# Patient Record
Sex: FEMALE | URBAN - METROPOLITAN AREA
[De-identification: names, ages, dates, MRNs, and addresses within clinical notes are randomized per-mention and may not be internally consistent; named-entity substitution may affect disease eponyms.]

---

## 2019-03-07 NOTE — PATIENT DISCUSSION
Recommend Mini lower lift, deep plain post-tragel, SMAS to ML/mid cheeks(discussed risks and benefits of sx. .. ).

## 2019-03-07 NOTE — PATIENT DISCUSSION
Recommend * units of Botox. Patient elects Botox injection. *units discarded, * units used. (discussed risks and benefits of BOTOX. ..) 50 units rec.

## 2019-03-07 NOTE — PATIENT DISCUSSION
Recommend 1 ultra  plus 1 cc of Juvederm (discussed risks and benefits. ..). would only rec lips in sx to fill.

## 2019-03-07 NOTE — PATIENT DISCUSSION
Recommend ultra plus x 2cc's of vollure (discussed risks and benefits. ..) use a tiny bit of juvederm into glabella lines that are more prom.

## 2019-03-11 NOTE — PATIENT DISCUSSION
This visual field clearly demonstrated a minimum of 47% loss of upper field of vision OU, with upper lid skin in repose and elevated by taping of the lid to demonstrate potential correction. This field shows that taping the lids significantly improved this patient's superior field of vision by approximately 44%, OU.

## 2019-04-22 ENCOUNTER — IMPORTED ENCOUNTER (OUTPATIENT)
Dept: URBAN - METROPOLITAN AREA CLINIC 1 | Facility: CLINIC | Age: 69
End: 2019-04-22

## 2019-04-22 PROBLEM — H52.4: Noted: 2019-04-22

## 2019-04-22 PROCEDURE — S0620 ROUTINE OPHTHALMOLOGICAL EXA: HCPCS

## 2019-04-22 NOTE — PATIENT DISCUSSION
1.  Presbyopia: Rx was given for corrective spectacles if indicated. 2.  Hx of cataracts OU3. Return for an appointment in 1 year for 40. with Dr. Audra Inman.

## 2020-01-14 NOTE — PATIENT DISCUSSION
Recommend Mini lower lift, deep plain post-tragel, SMAS to ML/mid cheeks(discussed risks and benefits of sx. .. ). Esme Castro [phone]

## 2020-01-14 NOTE — PATIENT DISCUSSION
***The patient did not appreciate the near contact lens trial OS. Cataract surgery has been performed in the first eye and activities of daily living are still impaired. The patient would like to proceed with cataract surgery in the second eye as scheduled. The patient elects Custom OS, goal of Emmtropia***.

## 2020-01-14 NOTE — PATIENT DISCUSSION
Custom monovision helps to lessen the dependence on glasses, but is a compromise and glasses are often still needed for some activities including driving, binocular vision, and intermediate area activities. The patient understands there is a possibility they may need an enhancement after surgery.

## 2020-01-14 NOTE — PATIENT DISCUSSION
After Custom Emmetropia OD, preform a contact lens trial with Dr. Ryan Thomason for a near goal OS to trial Custom Monovision. If patient appreciates near contact lens proceed with custom -2.00 OS. If patient does not like near contact lens, proceed with custom emmetropia OS.

## 2020-01-14 NOTE — PATIENT DISCUSSION
The patient was informed that with [address] [facility] for distance, they will need glasses for all near and intermediate activities after surgery. The patient understands there is a possibility they may need an enhancement after surgery. The patient elects Custom Vision OD, goal of emmetropia.

## 2020-01-14 NOTE — PATIENT DISCUSSION
The patient highly wishes to reduce the dependence on glasses. Discussed with the patient they are not a candidate for the advance option due to macular degeneration.

## 2020-01-17 NOTE — PATIENT DISCUSSION
After Custom Emmetropia OD, preform a contact lens trial with Dr. Soren Fishman for a near goal OS to trial Custom Monovision. If patient appreciates near contact lens proceed with custom -2.00 OS. If patient does not like near contact lens, proceed with custom emmetropia OS.

## 2020-01-17 NOTE — PATIENT DISCUSSION
The patient was informed that with [address] [facility] for distance, they will need glasses for all near and intermediate activities after surgery. The patient understands there is a possibility they may need an enhancement after surgery. The patient elects Custom Vision OD, goal of emmetropia.

## 2020-01-17 NOTE — PATIENT DISCUSSION
The patient was informed that with [address] [facility] for distance, they will need glasses for all near and intermediate activities after surgery. The patient understands there is a possibility they may need an enhancement after surgery. The patient elects Custom Vision OD, goal of emmetropia.

## 2020-01-17 NOTE — PATIENT DISCUSSION
After Custom Emmetropia OD, preform a contact lens trial with Dr. Reggie Gray for a near goal OS to trial Custom Monovision. If patient appreciates near contact lens proceed with custom -2.00 OS. If patient does not like near contact lens, proceed with custom emmetropia OS.

## 2020-01-17 NOTE — PATIENT DISCUSSION
The patient was informed that with [address] [facility] for distance, they will need glasses for all near and intermediate activities after surgery. The patient understands there is a possibility they may need an enhancement after surgery. The patient elects Custom Vision OD, goal of emmetropia.

## 2020-01-17 NOTE — PATIENT DISCUSSION
After Custom Emmetropia OD, perform a contact lens trial with Dr. Wood Cosme for a near goal OS to trial Custom Monovision. If patient appreciates near contact lens, proceed with Custom -2.00 OS. If patient does not like near contact lens, proceed with custom emmetropia OS.

## 2020-01-20 NOTE — PATIENT DISCUSSION
After Custom Emmetropia OD, preform a contact lens trial with Dr. Iwona Mota for a near goal OS to trial Custom Monovision. If patient appreciates near contact lens proceed with custom -2.00 OS. If patient does not like near contact lens, proceed with custom emmetropia OS.

## 2020-01-27 NOTE — PATIENT DISCUSSION
Cataract surgery has been performed in the first eye and activities of daily living are still impaired. The patient would like to proceed with cataract surgery in the second eye as scheduled. The patient elects Custom Vision OS, goal of Emmetropia.

## 2020-07-08 ENCOUNTER — IMPORTED ENCOUNTER (OUTPATIENT)
Dept: URBAN - METROPOLITAN AREA CLINIC 1 | Facility: CLINIC | Age: 70
End: 2020-07-08

## 2020-07-08 PROBLEM — H52.13: Noted: 2020-07-08

## 2020-07-08 PROBLEM — H52.223: Noted: 2020-07-08

## 2020-07-08 PROBLEM — H52.4: Noted: 2020-07-08

## 2020-07-08 PROCEDURE — S0621 ROUTINE OPHTHALMOLOGICAL EXA: HCPCS

## 2020-07-08 NOTE — PATIENT DISCUSSION
1. Myopia w/ High Astigmatism OU -- Rx was given for correction if indicated and requested. 2. Presbyopia -- Rx was given for corrective spectacles if indicated. 3.  Cataract OU -- Slight progression OU and patient c/o glare. Recommend patient return for dilated medical exam to further evaluate. Return for an appointment in 1-6 months for a 30/glare with Dr. Maria Esther Dewitt.

## 2020-08-28 ENCOUNTER — IMPORTED ENCOUNTER (OUTPATIENT)
Dept: URBAN - METROPOLITAN AREA CLINIC 1 | Facility: CLINIC | Age: 70
End: 2020-08-28

## 2020-08-28 PROBLEM — H25.813: Noted: 2020-08-28

## 2020-08-28 PROCEDURE — 92136 OPHTHALMIC BIOMETRY: CPT

## 2020-08-28 PROCEDURE — 92014 COMPRE OPH EXAM EST PT 1/>: CPT

## 2020-08-28 NOTE — PATIENT DISCUSSION
1. Cataract OU --  Visually Significant secondary to glare OU discussed the risks benefits alternatives and limitations of cataract surgery. The patient stated a full understanding and a desire to proceed with the procedure. Discussed with patient if PO Gtts are more than $120 for all three combined when filling at their Pharmacy please call our office to request generic substitutions. *Not MF candidate due to high degree of Astigmatism. Pt came to pre-op appointment today alone and Lifestyle Questionnaire Completed. Pt understands they will need glasses post-op to achieve their best corrected vision.    Phaco PCL OS then ODPhaco PCL OS F/u as scheduled

## 2020-09-17 ENCOUNTER — IMPORTED ENCOUNTER (OUTPATIENT)
Dept: URBAN - METROPOLITAN AREA CLINIC 1 | Facility: CLINIC | Age: 70
End: 2020-09-17

## 2020-09-17 PROBLEM — Z96.1: Noted: 2020-09-17

## 2020-09-17 PROCEDURE — 99024 POSTOP FOLLOW-UP VISIT: CPT

## 2020-09-17 NOTE — PATIENT DISCUSSION
POD#1 CE/IOL OS (Toric) doing well. Mild pressure spike OS. 1 gtt Combigan OS applied prior to leaving. *Dextenza in placeUse  Prolensa Qdaily OS Ocuflox TID OS : Use all gtts through completion of PO gtt chart regimen/ Per our instructions given to patient.   Post op Warnings Reiterated RTC as scheduled

## 2020-09-24 ENCOUNTER — IMPORTED ENCOUNTER (OUTPATIENT)
Dept: URBAN - METROPOLITAN AREA CLINIC 1 | Facility: CLINIC | Age: 70
End: 2020-09-24

## 2020-09-24 PROBLEM — H25.811: Noted: 2020-09-24

## 2020-09-24 PROCEDURE — 92136 OPHTHALMIC BIOMETRY: CPT

## 2020-09-24 NOTE — PATIENT DISCUSSION
1.  Cataract OD: Visually Significant secondary to glare discussed the risks benefits alternatives and limitations of cataract surgery. The patient stated a full understanding and a desire to proceed with the procedure. Discussed with patient if PO Gtts are more than $120 for all three combined when filling at their Pharmacy please call our office to request generic substitutions. Phaco PCL OD 2. POW#3  CE/IOL OS (Toric) doing well. *Dextenza in place  Use Prolensa Qdaily OS: Use through completion of po gtt regimen.  F/u as scheduled 2nd eye

## 2020-09-30 ENCOUNTER — IMPORTED ENCOUNTER (OUTPATIENT)
Dept: URBAN - METROPOLITAN AREA CLINIC 1 | Facility: CLINIC | Age: 70
End: 2020-09-30

## 2020-10-01 ENCOUNTER — IMPORTED ENCOUNTER (OUTPATIENT)
Dept: URBAN - METROPOLITAN AREA CLINIC 1 | Facility: CLINIC | Age: 70
End: 2020-10-01

## 2020-10-01 PROBLEM — Z96.1: Noted: 2020-10-01

## 2020-10-01 PROCEDURE — 99024 POSTOP FOLLOW-UP VISIT: CPT

## 2020-10-01 NOTE — PATIENT DISCUSSION
1. POD#1 Phaco/ PCL OD (Toric) - doing well. Corneal Edema noted on today's exam OD. *Dextenza in place Use Prolensa Qdaily OD Ocuflox TID OD : Use all gtts through completion of PO gtt chart regimen/ Per our instructions given. Post op Warnings Reiterated 2. POW#3 Phaco/ PCL OS (Toric) - doing well  *[place] in place  Use Prolensa Qdaily OS: Use through completion of po gtt regimen.  RTC as scheduled

## 2020-10-22 ENCOUNTER — IMPORTED ENCOUNTER (OUTPATIENT)
Dept: URBAN - METROPOLITAN AREA CLINIC 1 | Facility: CLINIC | Age: 70
End: 2020-10-22

## 2020-10-22 PROBLEM — Z96.1: Noted: 2020-10-22

## 2020-10-22 PROCEDURE — 99024 POSTOP FOLLOW-UP VISIT: CPT

## 2020-10-22 NOTE — PATIENT DISCUSSION
POM#1 CE/IOL OU (Toric OU) doing well. *Dextenza in place OU  Use Prolensa Qdaily OD: Use gtts through completion of PO gtt regimen. MRX for glasses given. Return for an appointment in 6 months 27 with Dr. Kayley Kenney.

## 2021-04-27 ENCOUNTER — IMPORTED ENCOUNTER (OUTPATIENT)
Dept: URBAN - METROPOLITAN AREA CLINIC 1 | Facility: CLINIC | Age: 71
End: 2021-04-27

## 2021-04-27 PROBLEM — H26.493: Noted: 2021-04-27

## 2021-04-27 PROBLEM — Z96.1: Noted: 2021-04-27

## 2021-04-27 PROBLEM — H04.123: Noted: 2021-04-27

## 2021-04-27 PROCEDURE — 92015 DETERMINE REFRACTIVE STATE: CPT

## 2021-04-27 PROCEDURE — 99214 OFFICE O/P EST MOD 30 MIN: CPT

## 2021-04-27 NOTE — PATIENT DISCUSSION
1.  PCO OU- Visually Significant *secondary to glare*; schedule YAG Cap OD then OS. Pros cons risks and benefits. 2.  Dry Eyes OU - Recommend ATs QID OU routinely 3. Pseudophakia OU - (Toric OU)Return for an appointment in YAG Cap OD then OS with Dr. Romulo Cardenas.

## 2021-09-10 ENCOUNTER — IMPORTED ENCOUNTER (OUTPATIENT)
Dept: URBAN - METROPOLITAN AREA CLINIC 1 | Facility: CLINIC | Age: 71
End: 2021-09-10

## 2021-09-10 PROBLEM — H26.491: Noted: 2021-09-10

## 2021-09-10 PROCEDURE — 66821 AFTER CATARACT LASER SURGERY: CPT

## 2021-10-01 ENCOUNTER — IMPORTED ENCOUNTER (OUTPATIENT)
Dept: URBAN - METROPOLITAN AREA CLINIC 1 | Facility: CLINIC | Age: 71
End: 2021-10-01

## 2021-10-01 PROBLEM — H26.492: Noted: 2021-10-01

## 2021-10-01 PROCEDURE — 66821 AFTER CATARACT LASER SURGERY: CPT

## 2021-10-26 ENCOUNTER — IMPORTED ENCOUNTER (OUTPATIENT)
Dept: URBAN - METROPOLITAN AREA CLINIC 1 | Facility: CLINIC | Age: 71
End: 2021-10-26

## 2021-10-26 PROBLEM — H26.40: Noted: 2021-10-26

## 2021-10-26 PROCEDURE — 99024 POSTOP FOLLOW-UP VISIT: CPT

## 2021-11-02 NOTE — PROCEDURE NOTE: CLINICAL
PROCEDURE NOTE: Excision of Eyelid Lesion Right Lower Lid. Diagnosis: Eyelid Lesion, Benign. Anesthesia: Topical. Prep: Alcohol Prep/Wipe. Risks, benefits and alternatives discussed. Patient desires to proceed with excision of growth/lesion today. A drop of proparacaine was instilled in the involved eye. A tetracaine drop was used on a cotton tipped applicator and placed on the conjunctiva. The involved area was anesthetized using 1% lidocaine with epi. The lesion was excised using mini Westcotts and forceps and discarded. The wound was cauterized to achieve hemostasis. Erythromycin ophthalmic ointment was applied. Post op instructions were given to the patient. The patient tolerated the procedure well and left in good condition. The patient understands to call us for any concerns. Herlinda Aguiar PROCEDURE NOTE: Excision of Eyelid Lesion Left Upper Lid. Diagnosis: Eyelid Lesion, Benign. Anesthesia: Topical. Prep: Alcohol Prep/Wipe. Risks, benefits and alternatives discussed. Patient desires to proceed with excision of growth/lesion today. A drop of proparacaine was instilled in the involved eye. . The involved area was anesthetized using 1% lidocaine with epi. The lesion was excised using mini Westcotts and forceps and discarded. The wound was cauterized to achieve hemostasis. Erythromycin ophthalmic ointment was applied. Post op instructions were given to the patient. The patient tolerated the procedure well and left in good condition. The patient understands to call us for any concerns. Herlinda Aguiar

## 2022-04-02 ASSESSMENT — VISUAL ACUITY
OS_SC: 20/20
OS_CC: 20/100
OD_SC: 20/25
OS_CC: 20/100
OD_GLARE: 20/400
OD_CC: J2
OS_CC: J2
OD_SC: 20/20
OD_CC: J2
OS_GLARE: 20/400
OS_GLARE: 20/400
OS_GLARE: 20/50
OD_SC: 20/30-2
OS_CC: 20/20
OS_CC: 20/25
OS_SC: 20/20
OS_SC: 20/30
OD_CC: 20/25
OS_CC: J1
OD_CC: 20/30
OD_CC: 20/100
OS_CC: J2
OD_CC: 20/100
OD_CC: J1
OD_SC: 20/30-2
OD_GLARE: 20/50
OD_SC: 20/30-2
OS_CC: 20/25-2
OD_GLARE: 20/400
OS_CC: 20/25-1
OS_SC: 20/30

## 2022-04-02 ASSESSMENT — KERATOMETRY
OD_AXISANGLE_DEGREES: 176
OD_K1POWER_DIOPTERS: 45.50
OS_AXISANGLE_DEGREES: 003
OS_K1POWER_DIOPTERS: 46.25
OS_K2POWER_DIOPTERS: 43.50
OD_K2POWER_DIOPTERS: 42.75
OD_AXISANGLE2_DEGREES: 085
OS_AXISANGLE2_DEGREES: 093

## 2022-04-02 ASSESSMENT — TONOMETRY
OS_IOP_MMHG: 18
OS_IOP_MMHG: 19
OS_IOP_MMHG: 23
OS_IOP_MMHG: 15
OD_IOP_MMHG: 15
OS_IOP_MMHG: 14
OS_IOP_MMHG: 19
OD_IOP_MMHG: 19
OD_IOP_MMHG: 14
OS_IOP_MMHG: 21
OD_IOP_MMHG: 14
OD_IOP_MMHG: 19

## 2022-10-27 ENCOUNTER — COMPREHENSIVE EXAM (OUTPATIENT)
Dept: URBAN - METROPOLITAN AREA CLINIC 1 | Facility: CLINIC | Age: 72
End: 2022-10-27

## 2022-10-27 DIAGNOSIS — Z96.1: ICD-10-CM

## 2022-10-27 DIAGNOSIS — H04.123: ICD-10-CM

## 2022-10-27 PROCEDURE — 99214 OFFICE O/P EST MOD 30 MIN: CPT

## 2022-10-27 PROCEDURE — 92015 DETERMINE REFRACTIVE STATE: CPT

## 2022-10-27 ASSESSMENT — VISUAL ACUITY
OS_CC: 20/20
OU_CC: J1+
OD_CC: 20/25

## 2022-10-27 ASSESSMENT — TONOMETRY
OD_IOP_MMHG: 18
OS_IOP_MMHG: 20

## 2023-11-16 ENCOUNTER — COMPREHENSIVE EXAM (OUTPATIENT)
Dept: URBAN - METROPOLITAN AREA CLINIC 1 | Facility: CLINIC | Age: 73
End: 2023-11-16

## 2023-11-16 DIAGNOSIS — Z96.1: ICD-10-CM

## 2023-11-16 DIAGNOSIS — H04.123: ICD-10-CM

## 2023-11-16 PROCEDURE — 92015 DETERMINE REFRACTIVE STATE: CPT

## 2023-11-16 PROCEDURE — 99214 OFFICE O/P EST MOD 30 MIN: CPT

## 2023-11-16 ASSESSMENT — VISUAL ACUITY
OD_CC: J1+
OD_CC: 20/20-2
OS_CC: 20/20
OS_CC: J1+

## 2023-11-16 ASSESSMENT — TONOMETRY
OD_IOP_MMHG: 16
OS_IOP_MMHG: 17

## 2024-03-26 NOTE — PATIENT DISCUSSION
+LensAR.
BP control. No HR OU.
Continue with same eyeglasses.
Maxitrol krista Qhs OD for 1 week.
Monitor for increased risk corneal edema.
Monitor.
Patient is cleared for anesthesia.
Patient made aware of 24/7 emergency services.
Recommend * units of Botox. Patient elects Botox injection. *units discarded, * units used. (discussed risks and benefits of BOTOX. ..) 50 units rec.
Recommend 1 ultra  plus 1 cc of Juvederm (discussed risks and benefits. ..). would only rec lips in sx to fill.
Recommend 2 x vollumacc's of Juvederm (discussed risks and benefits. .. ).
Recommend Bilateral lower lid blepharoplasty trans conj laser (discussed risks and benefits of sx. .. ).
Recommend Bilateral upper lid blepharoplasty. Medicare(discussed risks and benefits of sx. .. ).
Recommend Mini lower lift, deep plain post-tragel, SMAS to ML/mid cheeks(discussed risks and benefits of sx. .. ).
Recommend Upper lip CO2 laser (discussed risks and benefits of sx. .. ).
Recommend ultra plus x 2cc's of vollure (discussed risks and benefits. ..) use a tiny bit of juvederm into glabella lines that are more prom.
Recommended observation.
Recommended warm compresses.
Stable.
This visual field clearly demonstrated a minimum of 47% loss of upper field of vision OU, with upper lid skin in repose and elevated by taping of the lid to demonstrate potential correction. This field shows that taping the lids significantly improved this patient's superior field of vision by approximately 44%, OU.
72

## 2024-11-14 ENCOUNTER — COMPREHENSIVE EXAM (OUTPATIENT)
Dept: URBAN - METROPOLITAN AREA CLINIC 1 | Facility: CLINIC | Age: 74
End: 2024-11-14

## 2024-11-14 DIAGNOSIS — Z96.1: ICD-10-CM

## 2024-11-14 DIAGNOSIS — H04.123: ICD-10-CM

## 2024-11-14 PROCEDURE — 92015 DETERMINE REFRACTIVE STATE: CPT

## 2024-11-14 PROCEDURE — 99214 OFFICE O/P EST MOD 30 MIN: CPT
